# Patient Record
Sex: FEMALE | Race: WHITE | URBAN - NONMETROPOLITAN AREA
[De-identification: names, ages, dates, MRNs, and addresses within clinical notes are randomized per-mention and may not be internally consistent; named-entity substitution may affect disease eponyms.]

---

## 2020-01-06 ENCOUNTER — NEW PATIENT (OUTPATIENT)
Dept: URBAN - NONMETROPOLITAN AREA CLINIC 13 | Facility: CLINIC | Age: 65
End: 2020-01-06
Payer: OTHER GOVERNMENT

## 2020-01-06 DIAGNOSIS — E11.9 TYPE 2 DIABETES MELLITUS WITHOUT COMPLICATIONS: ICD-10-CM

## 2020-01-06 PROCEDURE — 92015 DETERMINE REFRACTIVE STATE: CPT | Performed by: OPTOMETRIST

## 2020-01-06 PROCEDURE — 92004 COMPRE OPH EXAM NEW PT 1/>: CPT | Performed by: OPTOMETRIST

## 2020-01-06 ASSESSMENT — INTRAOCULAR PRESSURE
OS: 13
OD: 12

## 2020-01-06 ASSESSMENT — VISUAL ACUITY
OS: 20/20
OD: 20/20

## 2020-02-06 ENCOUNTER — NEW PATIENT (OUTPATIENT)
Dept: URBAN - NONMETROPOLITAN AREA CLINIC 13 | Facility: CLINIC | Age: 65
End: 2020-02-06
Payer: OTHER GOVERNMENT

## 2020-02-06 PROCEDURE — 92004 COMPRE OPH EXAM NEW PT 1/>: CPT | Performed by: OPHTHALMOLOGY

## 2020-02-06 RX ORDER — PREDNISOLONE ACETATE 10 MG/ML
1 % SUSPENSION/ DROPS OPHTHALMIC
Qty: 10 | Refills: 1 | Status: INACTIVE
Start: 2020-02-06 | End: 2020-03-11

## 2020-02-06 RX ORDER — OFLOXACIN 3 MG/ML
0.3 % SOLUTION/ DROPS OPHTHALMIC
Qty: 5 | Refills: 1 | Status: INACTIVE
Start: 2020-02-06 | End: 2020-02-14

## 2020-02-06 ASSESSMENT — INTRAOCULAR PRESSURE
OD: 10
OS: 10

## 2020-02-06 ASSESSMENT — VISUAL ACUITY
OD: 20/20
OS: 20/20

## 2020-03-09 ENCOUNTER — Encounter (OUTPATIENT)
Dept: URBAN - NONMETROPOLITAN AREA CLINIC 13 | Facility: CLINIC | Age: 65
End: 2020-03-09
Payer: OTHER GOVERNMENT

## 2020-03-09 PROCEDURE — 99213 OFFICE O/P EST LOW 20 MIN: CPT | Performed by: PHYSICIAN ASSISTANT

## 2020-09-22 ENCOUNTER — FOLLOW UP ESTABLISHED (OUTPATIENT)
Dept: URBAN - NONMETROPOLITAN AREA CLINIC 13 | Facility: CLINIC | Age: 65
End: 2020-09-22
Payer: OTHER GOVERNMENT

## 2020-09-22 PROCEDURE — 92014 COMPRE OPH EXAM EST PT 1/>: CPT | Performed by: OPHTHALMOLOGY

## 2020-09-22 RX ORDER — OFLOXACIN 3 MG/ML
0.3 % SOLUTION/ DROPS OPHTHALMIC
Qty: 5 | Refills: 1 | Status: INACTIVE
Start: 2020-09-22 | End: 2020-12-22

## 2020-09-22 RX ORDER — PREDNISOLONE ACETATE 10 MG/ML
1 % SUSPENSION/ DROPS OPHTHALMIC
Qty: 10 | Refills: 1 | Status: INACTIVE
Start: 2020-09-22 | End: 2021-05-04

## 2020-09-22 ASSESSMENT — VISUAL ACUITY
OD: 20/20
OS: 20/25

## 2020-09-22 ASSESSMENT — INTRAOCULAR PRESSURE
OS: 22
OD: 17

## 2020-10-12 ENCOUNTER — PROCEDURE (OUTPATIENT)
Dept: URBAN - NONMETROPOLITAN AREA CLINIC 13 | Facility: CLINIC | Age: 65
End: 2020-10-12
Payer: OTHER GOVERNMENT

## 2020-10-12 ENCOUNTER — Encounter (OUTPATIENT)
Dept: URBAN - NONMETROPOLITAN AREA CLINIC 13 | Facility: CLINIC | Age: 65
End: 2020-10-12

## 2020-10-12 DIAGNOSIS — Z01.818 ENCOUNTER FOR OTHER PREPROCEDURAL EXAMINATION: Primary | ICD-10-CM

## 2020-10-12 DIAGNOSIS — H25.013 CORTICAL AGE-RELATED CATARACT, BILATERAL: ICD-10-CM

## 2020-10-12 PROCEDURE — 99213 OFFICE O/P EST LOW 20 MIN: CPT | Performed by: PHYSICIAN ASSISTANT

## 2020-10-20 ENCOUNTER — SURGERY (OUTPATIENT)
Dept: URBAN - NONMETROPOLITAN AREA SURGERY 4 | Facility: SURGERY | Age: 65
End: 2020-10-20
Payer: OTHER GOVERNMENT

## 2020-10-20 PROCEDURE — 66984 XCAPSL CTRC RMVL W/O ECP: CPT | Performed by: OPHTHALMOLOGY

## 2020-10-21 ENCOUNTER — POST OP (OUTPATIENT)
Dept: URBAN - NONMETROPOLITAN AREA CLINIC 13 | Facility: CLINIC | Age: 65
End: 2020-10-21

## 2020-10-21 PROCEDURE — 99024 POSTOP FOLLOW-UP VISIT: CPT | Performed by: OPTOMETRIST

## 2020-10-21 ASSESSMENT — INTRAOCULAR PRESSURE
OS: 11
OD: 12

## 2020-10-28 ENCOUNTER — POST OP (OUTPATIENT)
Dept: URBAN - NONMETROPOLITAN AREA CLINIC 13 | Facility: CLINIC | Age: 65
End: 2020-10-28

## 2020-10-28 PROCEDURE — 99024 POSTOP FOLLOW-UP VISIT: CPT | Performed by: OPTOMETRIST

## 2020-10-28 ASSESSMENT — VISUAL ACUITY
OS: 20/60
OD: 20/20

## 2020-10-28 ASSESSMENT — INTRAOCULAR PRESSURE
OD: 20
OS: 15

## 2020-11-05 ENCOUNTER — POST OP (OUTPATIENT)
Dept: URBAN - NONMETROPOLITAN AREA CLINIC 13 | Facility: CLINIC | Age: 65
End: 2020-11-05

## 2020-11-05 PROCEDURE — 99024 POSTOP FOLLOW-UP VISIT: CPT | Performed by: OPTOMETRIST

## 2020-11-05 ASSESSMENT — INTRAOCULAR PRESSURE
OD: 13
OS: 14

## 2020-11-05 ASSESSMENT — VISUAL ACUITY
OS: 20/60
OD: 20/20

## 2020-11-10 ENCOUNTER — POST OP (OUTPATIENT)
Dept: URBAN - NONMETROPOLITAN AREA CLINIC 13 | Facility: CLINIC | Age: 65
End: 2020-11-10

## 2020-11-10 PROCEDURE — 99024 POSTOP FOLLOW-UP VISIT: CPT | Performed by: OPTOMETRIST

## 2020-11-10 ASSESSMENT — INTRAOCULAR PRESSURE
OD: 18
OS: 16

## 2020-11-10 ASSESSMENT — VISUAL ACUITY
OS: 20/30
OD: 20/20

## 2020-12-01 ENCOUNTER — SURGERY (OUTPATIENT)
Dept: URBAN - NONMETROPOLITAN AREA SURGERY 4 | Facility: SURGERY | Age: 65
End: 2020-12-01
Payer: OTHER GOVERNMENT

## 2020-12-01 PROCEDURE — 66984 XCAPSL CTRC RMVL W/O ECP: CPT | Performed by: OPHTHALMOLOGY

## 2020-12-02 ENCOUNTER — POST OP (OUTPATIENT)
Dept: URBAN - NONMETROPOLITAN AREA CLINIC 13 | Facility: CLINIC | Age: 65
End: 2020-12-02

## 2020-12-02 DIAGNOSIS — Z96.1 PRESENCE OF INTRAOCULAR LENS: Primary | ICD-10-CM

## 2020-12-02 PROCEDURE — 99024 POSTOP FOLLOW-UP VISIT: CPT | Performed by: OPTOMETRIST

## 2020-12-02 ASSESSMENT — INTRAOCULAR PRESSURE
OD: 16
OS: 16

## 2020-12-08 ENCOUNTER — POST OP (OUTPATIENT)
Dept: URBAN - NONMETROPOLITAN AREA CLINIC 13 | Facility: CLINIC | Age: 65
End: 2020-12-08

## 2020-12-08 PROCEDURE — 99024 POSTOP FOLLOW-UP VISIT: CPT | Performed by: OPTOMETRIST

## 2020-12-08 ASSESSMENT — VISUAL ACUITY
OD: 20/20
OS: 20/25

## 2020-12-08 ASSESSMENT — INTRAOCULAR PRESSURE
OD: 19
OS: 17

## 2020-12-10 ENCOUNTER — SURGERY (OUTPATIENT)
Dept: URBAN - NONMETROPOLITAN AREA SURGERY 4 | Facility: SURGERY | Age: 65
End: 2020-12-10
Payer: OTHER GOVERNMENT

## 2020-12-10 DIAGNOSIS — H04.123 TEAR FILM INSUFFICIENCY OF BILATERAL LACRIMAL GLANDS: ICD-10-CM

## 2020-12-10 DIAGNOSIS — H26.492 OTHER SECONDARY CATARACT, LEFT EYE: Primary | ICD-10-CM

## 2020-12-10 PROCEDURE — 99024 POSTOP FOLLOW-UP VISIT: CPT | Performed by: OPHTHALMOLOGY

## 2020-12-10 ASSESSMENT — INTRAOCULAR PRESSURE: OS: 20

## 2020-12-10 ASSESSMENT — VISUAL ACUITY
OS: 20/25
OD: 20/20

## 2020-12-15 ENCOUNTER — SURGERY (OUTPATIENT)
Dept: URBAN - NONMETROPOLITAN AREA SURGERY 4 | Facility: SURGERY | Age: 65
End: 2020-12-15
Payer: OTHER GOVERNMENT

## 2020-12-15 PROCEDURE — 66821 AFTER CATARACT LASER SURGERY: CPT | Performed by: OPHTHALMOLOGY

## 2020-12-22 ENCOUNTER — POST OP (OUTPATIENT)
Dept: URBAN - NONMETROPOLITAN AREA CLINIC 13 | Facility: CLINIC | Age: 65
End: 2020-12-22

## 2020-12-22 PROCEDURE — 99024 POSTOP FOLLOW-UP VISIT: CPT | Performed by: OPTOMETRIST

## 2020-12-22 ASSESSMENT — INTRAOCULAR PRESSURE
OS: 16
OD: 15

## 2020-12-22 ASSESSMENT — VISUAL ACUITY
OS: 20/25
OD: 20/25

## 2021-01-07 ENCOUNTER — POST OP (OUTPATIENT)
Dept: URBAN - NONMETROPOLITAN AREA CLINIC 13 | Facility: CLINIC | Age: 66
End: 2021-01-07

## 2021-01-07 DIAGNOSIS — H26.491 OTHER SECONDARY CATARACT, RIGHT EYE: ICD-10-CM

## 2021-01-07 PROCEDURE — 99024 POSTOP FOLLOW-UP VISIT: CPT | Performed by: OPHTHALMOLOGY

## 2021-01-07 ASSESSMENT — INTRAOCULAR PRESSURE
OD: 16
OS: 18

## 2021-01-07 ASSESSMENT — VISUAL ACUITY
OD: 20/25
OS: 20/25

## 2021-01-12 ENCOUNTER — SURGERY (OUTPATIENT)
Dept: URBAN - NONMETROPOLITAN AREA SURGERY 4 | Facility: SURGERY | Age: 66
End: 2021-01-12

## 2021-01-12 PROCEDURE — 65772 CORRECTION OF ASTIGMATISM: CPT | Performed by: OPHTHALMOLOGY

## 2021-01-13 ENCOUNTER — POST OP (OUTPATIENT)
Dept: URBAN - NONMETROPOLITAN AREA CLINIC 13 | Facility: CLINIC | Age: 66
End: 2021-01-13

## 2021-01-13 PROCEDURE — 99024 POSTOP FOLLOW-UP VISIT: CPT | Performed by: OPTOMETRIST

## 2021-01-13 ASSESSMENT — INTRAOCULAR PRESSURE
OD: 13
OS: 15

## 2021-01-19 ENCOUNTER — POST OP (OUTPATIENT)
Dept: URBAN - NONMETROPOLITAN AREA CLINIC 13 | Facility: CLINIC | Age: 66
End: 2021-01-19

## 2021-01-19 PROCEDURE — 99024 POSTOP FOLLOW-UP VISIT: CPT | Performed by: OPTOMETRIST

## 2021-01-19 ASSESSMENT — VISUAL ACUITY
OD: 20/25
OS: 20/20

## 2021-01-19 ASSESSMENT — INTRAOCULAR PRESSURE
OD: 14
OS: 15

## 2021-02-04 ENCOUNTER — POST OP (OUTPATIENT)
Dept: URBAN - NONMETROPOLITAN AREA CLINIC 13 | Facility: CLINIC | Age: 66
End: 2021-02-04

## 2021-02-04 PROCEDURE — 99024 POSTOP FOLLOW-UP VISIT: CPT | Performed by: OPHTHALMOLOGY

## 2021-02-04 ASSESSMENT — INTRAOCULAR PRESSURE
OD: 27
OS: 26

## 2021-02-04 ASSESSMENT — VISUAL ACUITY
OS: 20/20
OD: 20/20

## 2021-03-30 ENCOUNTER — POST OP (OUTPATIENT)
Dept: URBAN - NONMETROPOLITAN AREA CLINIC 13 | Facility: CLINIC | Age: 66
End: 2021-03-30

## 2021-03-30 DIAGNOSIS — H52.222 REGULAR ASTIGMATISM, LEFT EYE: Primary | ICD-10-CM

## 2021-03-30 PROCEDURE — 99024 POSTOP FOLLOW-UP VISIT: CPT | Performed by: OPTOMETRIST

## 2021-03-30 ASSESSMENT — INTRAOCULAR PRESSURE
OD: 17
OS: 17

## 2021-03-30 ASSESSMENT — VISUAL ACUITY
OS: 20/30
OD: 20/30

## 2021-05-04 ENCOUNTER — OFFICE VISIT (OUTPATIENT)
Dept: URBAN - METROPOLITAN AREA CLINIC 33 | Facility: CLINIC | Age: 66
End: 2021-05-04
Payer: OTHER GOVERNMENT

## 2021-05-04 DIAGNOSIS — H53.19 OTHER SUBJECTIVE VISUAL DISTURBANCES: Primary | ICD-10-CM

## 2021-05-04 PROCEDURE — 99203 OFFICE O/P NEW LOW 30 MIN: CPT | Performed by: OPHTHALMOLOGY

## 2021-05-04 RX ORDER — PREDNISOLONE ACETATE 10 MG/ML
1 % SUSPENSION/ DROPS OPHTHALMIC
Qty: 10 | Refills: 1 | Status: INACTIVE
Start: 2021-05-04 | End: 2021-06-16

## 2021-05-04 RX ORDER — OFLOXACIN 3 MG/ML
0.3 % SOLUTION/ DROPS OPHTHALMIC
Qty: 5 | Refills: 1 | Status: INACTIVE
Start: 2021-05-04 | End: 2021-06-16

## 2021-05-04 ASSESSMENT — INTRAOCULAR PRESSURE
OS: 13
OD: 13

## 2021-05-04 ASSESSMENT — VISUAL ACUITY
OD: 20/30
OS: 20/30

## 2021-05-04 NOTE — IMPRESSION/PLAN
Impression: Other subjective visual disturbances: H53.19. Condition: quality of life issue. Symptoms: could improve with surgery. Vision: vision affected. Plan: Discussed diagnosis in detail with patient. Discussed treatment options with patient. Discussed IOL exchange. R/B/A discussed and understood by patient and patient elects to proceed with surgery RL-2 Patient understands that glasses may still be needed post operatively for best corrected visual acuity and reading glasses will be required. Recommend standard IOL Aim plano.  no Dexy Cu

## 2021-05-26 ENCOUNTER — TESTING ONLY (OUTPATIENT)
Dept: URBAN - METROPOLITAN AREA CLINIC 33 | Facility: CLINIC | Age: 66
End: 2021-05-26
Payer: OTHER GOVERNMENT

## 2021-05-26 PROCEDURE — 76519 ECHO EXAM OF EYE: CPT | Performed by: OPHTHALMOLOGY

## 2021-05-26 PROCEDURE — 92025 CPTRIZED CORNEAL TOPOGRAPHY: CPT | Performed by: OPHTHALMOLOGY

## 2021-05-26 ASSESSMENT — PACHYMETRY
OS: 23.56
OD: 23.51
OD: 2.84
OS: 2.90

## 2021-06-15 ENCOUNTER — SURGERY (OUTPATIENT)
Dept: URBAN - METROPOLITAN AREA SURGERY 15 | Facility: SURGERY | Age: 66
End: 2021-06-15

## 2021-06-15 PROCEDURE — 66986 EXCHANGE LENS PROSTHESIS: CPT | Performed by: OPHTHALMOLOGY

## 2021-06-16 ENCOUNTER — POST-OPERATIVE VISIT (OUTPATIENT)
Dept: URBAN - METROPOLITAN AREA CLINIC 33 | Facility: CLINIC | Age: 66
End: 2021-06-16

## 2021-06-16 PROCEDURE — 99024 POSTOP FOLLOW-UP VISIT: CPT | Performed by: OPTOMETRIST

## 2021-06-16 RX ORDER — PREDNISOLONE ACETATE 10 MG/ML
1 % SUSPENSION/ DROPS OPHTHALMIC
Qty: 10 | Refills: 1 | Status: ACTIVE
Start: 2021-06-16

## 2021-06-16 RX ORDER — OFLOXACIN 3 MG/ML
0.3 % SOLUTION/ DROPS OPHTHALMIC
Qty: 5 | Refills: 1 | Status: ACTIVE
Start: 2021-06-16

## 2021-06-16 ASSESSMENT — INTRAOCULAR PRESSURE
OD: 16
OS: 15

## 2021-06-16 NOTE — IMPRESSION/PLAN
Impression: S/P Replace IOL; Secondary intraocular lens implantation LI61SE 22.00 OD - 1 Day. Encounter for surgical aftercare following surgery on a sense organ  Z48.810. Discussed inflammation OD. Discussed vision will continue to improve. Patient is following up in [place].  Plan: --Taper Prednisolone acetate 1% QID x 2 wks, TID x 1 wk, BID x 1wk, QD x 1wk, then d/c
--Continue Ofloxacin QID x 1 wk then D/C

## 2021-06-22 ENCOUNTER — POST-OPERATIVE VISIT (OUTPATIENT)
Dept: URBAN - NONMETROPOLITAN AREA CLINIC 13 | Facility: CLINIC | Age: 66
End: 2021-06-22

## 2021-06-22 PROCEDURE — 99024 POSTOP FOLLOW-UP VISIT: CPT | Performed by: OPTOMETRIST

## 2021-06-22 ASSESSMENT — VISUAL ACUITY
OS: 20/25
OD: 20/50

## 2021-06-22 ASSESSMENT — INTRAOCULAR PRESSURE
OS: 16
OD: 18

## 2021-06-22 NOTE — IMPRESSION/PLAN
Impression: S/P Replace IOL; Secondary intraocular lens implantation OD - 7 Days. Encounter for surgical aftercare following surgery on a sense organ  Z48.810.  Plan: gtts as directed, recheck one week

## 2021-06-28 ENCOUNTER — POST-OPERATIVE VISIT (OUTPATIENT)
Dept: URBAN - NONMETROPOLITAN AREA CLINIC 13 | Facility: CLINIC | Age: 66
End: 2021-06-28

## 2021-06-28 PROCEDURE — 99024 POSTOP FOLLOW-UP VISIT: CPT | Performed by: OPTOMETRIST

## 2021-06-28 ASSESSMENT — VISUAL ACUITY: OD: 20/40

## 2021-06-28 ASSESSMENT — INTRAOCULAR PRESSURE: OD: 19

## 2021-06-28 NOTE — IMPRESSION/PLAN
Impression: S/P Replace IOL; Secondary intraocular lens implantation OD - 13 Days. Encounter for surgical aftercare following surgery on a sense organ  Z48.810. Post operative instructions reviewed - Plan: cont with gtts as directed, pt will have os R&R tomorrow.

## 2021-06-29 ENCOUNTER — SURGERY (OUTPATIENT)
Dept: URBAN - METROPOLITAN AREA SURGERY 15 | Facility: SURGERY | Age: 66
End: 2021-06-29

## 2021-06-29 PROCEDURE — 66986 EXCHANGE LENS PROSTHESIS: CPT | Performed by: OPHTHALMOLOGY

## 2021-06-30 ENCOUNTER — POST-OPERATIVE VISIT (OUTPATIENT)
Dept: URBAN - METROPOLITAN AREA CLINIC 33 | Facility: CLINIC | Age: 66
End: 2021-06-30

## 2021-06-30 PROCEDURE — 99024 POSTOP FOLLOW-UP VISIT: CPT | Performed by: OPTOMETRIST

## 2021-06-30 ASSESSMENT — INTRAOCULAR PRESSURE
OS: 25
OD: 18

## 2021-06-30 NOTE — IMPRESSION/PLAN
Impression: S/P Removal/Replace IOL OS - 1 Day. Presence of intraocular lens  Z96.1. Discussed with patient reason for suspended floaters and cobwebs are from kenalog medication sitting in the central vitreous. Dr. Pretty Amaro performed anterior vitrectomy and inserted Kenalog medication. Plan: 1 week Dr. Curtis Salcedo at [facility] Ofloxacin 0.3 % QID x 1 week Prednisolone 1% QID

## 2021-07-06 ENCOUNTER — POST-OPERATIVE VISIT (OUTPATIENT)
Dept: URBAN - NONMETROPOLITAN AREA CLINIC 13 | Facility: CLINIC | Age: 66
End: 2021-07-06

## 2021-07-06 PROCEDURE — 99024 POSTOP FOLLOW-UP VISIT: CPT | Performed by: OPHTHALMOLOGY

## 2021-07-06 ASSESSMENT — INTRAOCULAR PRESSURE
OD: 20
OS: 20

## 2021-07-06 ASSESSMENT — VISUAL ACUITY
OS: 20/50
OD: 20/40

## 2021-07-20 ENCOUNTER — POST-OPERATIVE VISIT (OUTPATIENT)
Dept: URBAN - NONMETROPOLITAN AREA CLINIC 13 | Facility: CLINIC | Age: 66
End: 2021-07-20

## 2021-07-20 PROCEDURE — 99024 POSTOP FOLLOW-UP VISIT: CPT | Performed by: OPTOMETRIST

## 2021-07-20 ASSESSMENT — VISUAL ACUITY
OD: 20/25
OS: 20/25

## 2021-07-20 ASSESSMENT — INTRAOCULAR PRESSURE
OS: 18
OD: 19

## 2021-08-03 ENCOUNTER — POST-OPERATIVE VISIT (OUTPATIENT)
Dept: URBAN - NONMETROPOLITAN AREA CLINIC 13 | Facility: CLINIC | Age: 66
End: 2021-08-03

## 2021-08-03 DIAGNOSIS — Z48.810 ENCOUNTER FOR SURGICAL AFTERCARE FOLLOWING SURGERY ON THE SENSE ORGANS: Primary | ICD-10-CM

## 2021-08-03 PROCEDURE — 99024 POSTOP FOLLOW-UP VISIT: CPT | Performed by: OPTOMETRIST

## 2021-08-03 ASSESSMENT — VISUAL ACUITY
OD: 20/40
OS: 20/30

## 2021-08-03 ASSESSMENT — INTRAOCULAR PRESSURE
OD: 18
OS: 17

## 2021-08-17 ENCOUNTER — POST-OPERATIVE VISIT (OUTPATIENT)
Dept: URBAN - NONMETROPOLITAN AREA CLINIC 13 | Facility: CLINIC | Age: 66
End: 2021-08-17

## 2021-08-17 PROCEDURE — 99024 POSTOP FOLLOW-UP VISIT: CPT | Performed by: OPTOMETRIST

## 2021-08-17 ASSESSMENT — INTRAOCULAR PRESSURE
OD: 13
OS: 23

## 2021-08-17 ASSESSMENT — VISUAL ACUITY
OD: 20/25
OS: 20/25

## 2021-08-17 NOTE — IMPRESSION/PLAN
Impression: Encounter for surgical aftercare following surgery on the sense organs: Z48.810. Plan: Recommend glasses for best vision. A copy of the new rx was given to the patient.